# Patient Record
Sex: FEMALE | Race: AMERICAN INDIAN OR ALASKA NATIVE
[De-identification: names, ages, dates, MRNs, and addresses within clinical notes are randomized per-mention and may not be internally consistent; named-entity substitution may affect disease eponyms.]

---

## 2018-02-23 ENCOUNTER — HOSPITAL ENCOUNTER (OUTPATIENT)
Dept: HOSPITAL 5 - CT | Age: 43
Discharge: HOME | End: 2018-02-23
Attending: SPECIALIST
Payer: COMMERCIAL

## 2018-02-23 DIAGNOSIS — R10.30: Primary | ICD-10-CM

## 2018-02-23 DIAGNOSIS — Z90.3: ICD-10-CM

## 2018-02-23 PROCEDURE — 74177 CT ABD & PELVIS W/CONTRAST: CPT

## 2018-02-23 NOTE — CAT SCAN REPORT
FINAL REPORT



EXAM:  CT ABDOMEN PELVIS W CON



HISTORY:  CHECKING FOR LEAK VSG SURG / ABD PAIN 



TECHNIQUE:  CT images are acquired through the Abdomen and Pelvis

arterial and delayed phases following ingestion of positive

enteric contrast and intravenous administration of contrast.

Transaxial, coronal and sagittal reformations are provided. 



PRIORS:  None



FINDINGS:  

Partially visualized intrathoracic contents are unremarkable.



The liver, gallbladder, pancreas, spleen, and adrenal glands are

unremarkable. 



Kidneys show no worrisome lesions, hydronephrosis, or calculi.

Urinary bladder is unremarkable. 



Patient status post sleeve gastrectomy. Positive enteric contrast

is predominantly within the jejunum and ileum and is seen as far

distally as the cecum. No contrast is seen within the stomach at

the time of the examination. There is no perigastric fluid or

stranding identified. No pneumoperitoneum. Small and large bowel

are normal in caliber. Normal appendix. 



Aorta is normal in course and caliber.



Superficial soft tissues are remarkable for mild anterior

abdominal wall edema and a midline surgical incision. No acute or

aggressive appearing skeletal findings.



IMPRESSION:  

No pneumoperitoneum or extravasation of enteric contrast to

suggest leak status post sleeve gastrectomy.

## 2021-08-06 ENCOUNTER — HOSPITAL ENCOUNTER (OUTPATIENT)
Dept: HOSPITAL 5 - SPVWC | Age: 46
Discharge: HOME | End: 2021-08-06
Attending: SURGERY
Payer: COMMERCIAL

## 2021-08-06 DIAGNOSIS — Z12.31: Primary | ICD-10-CM

## 2021-08-06 PROCEDURE — 77063 BREAST TOMOSYNTHESIS BI: CPT

## 2021-08-06 PROCEDURE — 77067 SCR MAMMO BI INCL CAD: CPT

## 2021-08-09 NOTE — MAMMOGRAPHY REPORT
DIGITAL SCREENING MAMMOGRAM WITH CAD, 8/6/2021



CLINICAL INFORMATION / INDICATION: Routine screening mammography.



TECHNIQUE:  Digital bilateral 2D mammography was obtained in the craniocaudal and mediolateral obliqu
e projections. This examination was interpreted with the benefit of Computer-Aided Detection analysis
.



COMPARISON: 8/5/2020, 4/9/2018



FINDINGS: 



Breast Density: The breasts are heterogeneously dense, which may obscure small masses.



No dominant mass, suspicious calcifications, or architectural distortion in either breast. 





 

IMPRESSION: No mammographic evidence of malignancy.



Follow up recommendation: Routine yearly



BI-RADS Category 1:  Negative.





-------------------------------------------------------------------------------------------

A "normal" or negative report should not discourage follow up or biopsy of a clinically significant f
inding.



A written summary of these findings will be mailed to the patient. The patient will be entered into a
 mammography reporting system which will generate a reminder letter for the patient's next appointmen
t at the appropriate interval.



The [employer] recommends yearly mammograms starting at age 40 and continuing as l
jose carlos as a woman is in good health.  Breast MRI is recommended for women with an approximate 20-25% or 
greater lifetime risk of breast cancer, including women with a strong family history of breast or ova
allyn cancer or who have been treated for Hodgkin's disease.



Signer Name: Barb Still MD 

Signed: 8/9/2021 8:22 AM

Workstation Name: [email or web address]

## 2021-08-09 NOTE — MAMMOGRAPHY REPORT
DIGITAL SCREENING MAMMOGRAM WITH CAD, 8/6/2021



CLINICAL INFORMATION / INDICATION: Routine screening mammography.



TECHNIQUE:  Digital bilateral 2D mammography was obtained in the craniocaudal and mediolateral obliqu
e projections. This examination was interpreted with the benefit of Computer-Aided Detection analysis
.



COMPARISON: 8/5/2020, 4/9/2018



FINDINGS: 



Breast Density: The breasts are heterogeneously dense, which may obscure small masses.



No dominant mass, suspicious calcifications, or architectural distortion in either breast. 





 

IMPRESSION: No mammographic evidence of malignancy.



Follow up recommendation: Routine yearly



BI-RADS Category 1:  Negative.





-------------------------------------------------------------------------------------------

A "normal" or negative report should not discourage follow up or biopsy of a clinically significant f
inding.



A written summary of these findings will be mailed to the patient. The patient will be entered into a
 mammography reporting system which will generate a reminder letter for the patient's next appointmen
t at the appropriate interval.



The [employer] recommends yearly mammograms starting at age 40 and continuing as l
jose carlos as a woman is in good health.  Breast MRI is recommended for women with an approximate 20-25% or 
greater lifetime risk of breast cancer, including women with a strong family history of breast or ova
allyn cancer or who have been treated for Hodgkin's disease.



Signer Name: Barb Still MD 

Signed: 8/9/2021 8:22 AM

Workstation Name: [email or web address]